# Patient Record
Sex: MALE | Race: ASIAN | URBAN - METROPOLITAN AREA
[De-identification: names, ages, dates, MRNs, and addresses within clinical notes are randomized per-mention and may not be internally consistent; named-entity substitution may affect disease eponyms.]

---

## 2023-10-20 ENCOUNTER — OFFICE VISIT (OUTPATIENT)
Dept: URGENT CARE | Facility: CLINIC | Age: 28
End: 2023-10-20

## 2023-10-20 VITALS
RESPIRATION RATE: 19 BRPM | DIASTOLIC BLOOD PRESSURE: 83 MMHG | WEIGHT: 154.31 LBS | HEIGHT: 72 IN | TEMPERATURE: 98 F | SYSTOLIC BLOOD PRESSURE: 132 MMHG | OXYGEN SATURATION: 98 % | BODY MASS INDEX: 20.9 KG/M2 | HEART RATE: 85 BPM

## 2023-10-20 DIAGNOSIS — F07.81 POST CONCUSSION SYNDROME: Primary | ICD-10-CM

## 2023-10-20 PROCEDURE — 99203 OFFICE O/P NEW LOW 30 MIN: CPT | Mod: S$GLB,,, | Performed by: FAMILY MEDICINE

## 2023-10-20 PROCEDURE — 99203 PR OFFICE/OUTPT VISIT, NEW, LEVL III, 30-44 MIN: ICD-10-PCS | Mod: S$GLB,,, | Performed by: FAMILY MEDICINE
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
## 2023-10-20 NOTE — PATIENT INSTRUCTIONS
Follow up with your specialist team in [place].    Patient should avoid any recreational activities that may result in a second head injury (eg, cycling, skateboarding, ice skating, or climbing) during recovery.  Patient requires a gradual and progressive return to non-contact, non-risk physical activity designed to avoid symptom exacerbation.  Patient should avoid cognitive effort that cause a resumption or worsening of symptoms; this includes activities with high levels of focus and concentration such as taking standardized tests.  Worsening of the patient's symptoms despite appropriately following a regimen of physical and cognitive rest would prompt the need to evaluate for other potential diagnoses.   Tylenol or nonsteroidal anti-inflammatories as needed for pain.
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
